# Patient Record
Sex: FEMALE | Race: WHITE | NOT HISPANIC OR LATINO | Employment: STUDENT | URBAN - METROPOLITAN AREA
[De-identification: names, ages, dates, MRNs, and addresses within clinical notes are randomized per-mention and may not be internally consistent; named-entity substitution may affect disease eponyms.]

---
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
## 2021-01-26 ENCOUNTER — TELEPHONE (OUTPATIENT)
Dept: FAMILY MEDICINE CLINIC | Facility: CLINIC | Age: 21
End: 2021-01-26

## 2021-01-26 NOTE — TELEPHONE ENCOUNTER
Patient called and stated that she got the first 2 guardisal shots, and she wanted to know if she is do for the 3rd or if not when will she be due   Please advise

## 2021-01-26 NOTE — TELEPHONE ENCOUNTER
Spoke with pt she would be due for her 3rd shot the 3rd or 4th week of February  Pt is at school will check schedule and call the schedule 3rd shot  Did advise we only have nurse visits M, W and Fridays

## 2021-02-05 ENCOUNTER — TELEMEDICINE (OUTPATIENT)
Dept: FAMILY MEDICINE CLINIC | Facility: CLINIC | Age: 21
End: 2021-02-05
Payer: COMMERCIAL

## 2021-02-05 DIAGNOSIS — U07.1 COVID-19: Primary | ICD-10-CM

## 2021-02-05 PROCEDURE — 1036F TOBACCO NON-USER: CPT | Performed by: NURSE PRACTITIONER

## 2021-02-05 PROCEDURE — 3725F SCREEN DEPRESSION PERFORMED: CPT | Performed by: NURSE PRACTITIONER

## 2021-02-05 PROCEDURE — 99213 OFFICE O/P EST LOW 20 MIN: CPT | Performed by: NURSE PRACTITIONER

## 2021-02-05 NOTE — PROGRESS NOTES
COVID-19 Virtual Visit     Assessment/Plan:    Problem List Items Addressed This Visit        Other    COVID-19 - Primary       COVID-19 Home Care Guidelines    Your healthcare provider and/or public health staff have evaluated you and have determined that you do not need to remain in the hospital at this time  At this time you can be isolated at home where you will be monitored by staff from your local or state health department  You should carefully follow the prevention and isolation steps below until a healthcare provider or local or state health department says that you can return to your normal activities  Stay home except to get medical care    People who are mildly ill with COVID-19 are able to isolate at home during their illness  You should restrict activities outside your home, except for getting medical care  Do not go to work, school, or public areas  Avoid using public transportation, ride-sharing, or taxis  Separate yourself from other people and animals in your home    People: As much as possible, you should stay in a specific room and away from other people in your home  Also, you should use a separate bathroom, if available  Animals: You should restrict contact with pets and other animals while you are sick with COVID-19, just like you would around other people  Although there have not been reports of pets or other animals becoming sick with COVID-19, it is still recommended that people sick with COVID-19 limit contact with animals until more information is known about the virus  When possible, have another member of your household care for your animals while you are sick  If you are sick with COVID-19, avoid contact with your pet, including petting, snuggling, being kissed or licked, and sharing food  If you must care for your pet or be around animals while you are sick, wash your hands before and after you interact with pets and wear a facemask   See COVID-19 and Animals for more information  Call ahead before visiting your doctor    If you have a medical appointment, call the healthcare provider and tell them that you have or may have COVID-19  This will help the healthcare providers office take steps to keep other people from getting infected or exposed  Wear a facemask    You should wear a facemask when you are around other people (e g , sharing a room or vehicle) or pets and before you enter a healthcare providers office  If you are not able to wear a facemask (for example, because it causes trouble breathing), then people who live with you should not stay in the same room with you, or they should wear a facemask if they enter your room  Cover your coughs and sneezes    Cover your mouth and nose with a tissue when you cough or sneeze  Throw used tissues in a lined trash can  Immediately wash your hands with soap and water for at least 20 seconds or, if soap and water are not available, clean your hands with an alcohol-based hand  that contains at least 60% alcohol  Clean your hands often    Wash your hands often with soap and water for at least 20 seconds, especially after blowing your nose, coughing, or sneezing; going to the bathroom; and before eating or preparing food  If soap and water are not readily available, use an alcohol-based hand  with at least 60% alcohol, covering all surfaces of your hands and rubbing them together until they feel dry  Soap and water are the best option if hands are visibly dirty  Avoid touching your eyes, nose, and mouth with unwashed hands  Avoid sharing personal household items    You should not share dishes, drinking glasses, cups, eating utensils, towels, or bedding with other people or pets in your home  After using these items, they should be washed thoroughly with soap and water      Clean all high-touch surfaces everyday    High touch surfaces include counters, tabletops, doorknobs, bathroom fixtures, toilets, phones, keyboards, tablets, and bedside tables  Also, clean any surfaces that may have blood, stool, or body fluids on them  Use a household cleaning spray or wipe, according to the label instructions  Labels contain instructions for safe and effective use of the cleaning product including precautions you should take when applying the product, such as wearing gloves and making sure you have good ventilation during use of the product  Monitor your symptoms    Seek prompt medical attention if your illness is worsening (e g , difficulty breathing)  Before seeking care, call your healthcare provider and tell them that you have, or are being evaluated for, COVID-19  Put on a facemask before you enter the facility  These steps will help the healthcare providers office to keep other people in the office or waiting room from getting infected or exposed  Ask your healthcare provider to call the local or [facility] health department  Persons who are placed under active monitoring or facilitated self-monitoring should follow instructions provided by their local health department or occupational health professionals, as appropriate  If you have a medical emergency and need to call 911, notify the dispatch personnel that you have, or are being evaluated for COVID-19  If possible, put on a facemask before emergency medical services arrive      Discontinuing home isolation    Patients with confirmed COVID-19 should remain under home isolation precautions until the following conditions are met:   - They have had no fever for at least 24 hours (that is one full day of no fever without the use medicine that reduces fevers)  AND  - other symptoms have improved (for example, when their cough or shortness of breath have improved)  AND  - If had mild or moderate illness, at least 10 days have passed since their symptoms first appeared or if severe illness (needed oxygen) or immunosuppressed, at least 20 days have passed since symptoms first appeared  Patients with confirmed COVID-19 should also notify close contacts (including their workplace) and ask that they self-quarantine  Currently, close contact is defined as being within 6 feet for 15 minutes or more from the period 24 hours starting 48 hours before symptom onset to the time at which the patient went into isolation  Close contacts of patients diagnosed with COVID-19 should be instructed by the patient to self-quarantine for 14 days from the last time of their last contact with the patient  Source: [employer]   Disposition:     I recommended self-quarantine for 10 days and to watch for symptoms until 14 days after exposure  If patient were to develop symptoms, they should self isolate and call our office for further guidance  I have spent 15 minutes directly with the patient  Greater than 50% of this time was spent in counseling/coordination of care regarding: diagnostic results, prognosis, risks and benefits of treatment options, instructions for management, patient and family education, importance of treatment compliance, risk factor reductions and impressions  Encounter provider SAMUEL Soni    Provider located at [address] [place] [phone]    Recent Visits  Date Type Provider Dept   02/05/21 [id number] SAMUEL [facility]   Showing recent visits within past 7 days and meeting all other requirements     Future Appointments  No visits were found meeting these conditions  Showing future appointments within next 150 days and meeting all other requirements      This virtual check-in was done via [employer] and patient was informed that this is not a secure, HIPAA-compliant platform  She agrees to proceed      Patient agrees to participate in a virtual check in via telephone or video visit instead of presenting to the office to address urgent/immediate medical needs  Patient is aware this is a billable service  After connecting through [facility], the patient was identified by name and date of birth  Boom Qiu was informed that this was a telemedicine visit and that the exam was being conducted confidentially over secure lines  My office door was closed  No one else was in the room  Boom Qiu acknowledged consent and understanding of privacy and security of the telemedicine visit  I informed the patient that I have reviewed her record in Epic and presented the opportunity for her to ask any questions regarding the visit today  The patient agreed to participate  Subjective:   Boom Qiu is a 24 y o  female who is concerned about COVID-19  Patient's symptoms include chills, fatigue, nasal congestion, rhinorrhea, anosmia, loss of taste, shortness of breath and myalgias  Patient denies fever, malaise, sore throat, cough, chest tightness, abdominal pain, nausea, vomiting, diarrhea and headaches       Exposure:   Contact with a person who is under investigation (PUI) for or who is positive for COVID-19 within the last 14 days?: Yes    Hospitalized recently for fever and/or lower respiratory symptoms?: No      Currently a healthcare worker that is involved in direct patient care?: No      Works in a special setting where the risk of COVID-19 transmission may be high? (this may include long-term care, correctional and assisted facilities; homeless shelters; assisted-living facilities and group homes ): No      Resident in a special setting where the risk of COVID-19 transmission may be high? (this may include long-term care, correctional and assisted facilities; homeless shelters; assisted-living facilities and group homes ): No      Went to school on 1/17 and came home on 1/21  Went back to school on 1/26 and boyfriend tested positive on 1/27  Her tests were negative at first  Then tested positive on 2/3  Symptoms started on 1/29  Body aches, ha, fatigue, loss of taste and smell, fever  Now feels better  Mild achy and no taste and smell  Staying in isolation        Lab Results   Component Value Date    SARSCORONAVI Detected (A) 02/03/2021     Past Medical History:   Diagnosis Date    Febrile seizure ([facility] )     10/21/2003, 1/2005, 6/2005    Formula intolerance     GE reflux     History of being hospitalized 10/21/2003    FEB SZ, RLL PNEUMONIA, HERPANGINA, EEG WNL    Pneumomediastinum ([facility] ) 03/01/2007    SPONTANEOUS, NO UNDERLYING PROBLEM    Pneumonia      Past Surgical History:   Procedure Laterality Date    NO PAST SURGERIES      WISDOM TOOTH EXTRACTION       Current Outpatient Medications   Medication Sig Dispense Refill    Estarylla 0 25-35 MG-MCG per tablet Take 1 tablet by mouth daily       No current facility-administered medications for this visit  No Known Allergies    Review of Systems   Constitutional: Positive for chills and fatigue  Negative for fever  HENT: Positive for congestion and rhinorrhea  Negative for sore throat  Respiratory: Positive for shortness of breath  Negative for cough and chest tightness  Cardiovascular: Negative for chest pain and palpitations  Gastrointestinal: Negative for abdominal pain, diarrhea, nausea and vomiting  Genitourinary: Negative for dysuria and frequency  Musculoskeletal: Positive for myalgias  Skin: Negative for rash  Neurological: Negative for dizziness, light-headedness and headaches  Hematological: Negative for adenopathy  Psychiatric/Behavioral: Negative for decreased concentration  The patient is not nervous/anxious  Objective: There were no vitals filed for this visit  Physical Exam  Constitutional:       Appearance: Normal appearance  HENT:      Head: Normocephalic and atraumatic  Eyes:      Conjunctiva/sclera: Conjunctivae normal    Neck:      Musculoskeletal: Normal range of motion     Pulmonary:      Effort: Pulmonary effort is normal    Musculoskeletal: Normal range of motion  Neurological:      Mental Status: She is alert and oriented to person, place, and time  Psychiatric:         Mood and Affect: Mood normal          Behavior: Behavior normal      BMI Counseling: There is no height or weight on file to calculate BMI  The BMI is above normal  Nutrition recommendations include reducing portion sizes, decreasing overall calorie intake, 3-5 servings of fruits/vegetables daily, reducing fast food intake, consuming healthier snacks, decreasing soda and/or juice intake, moderation in carbohydrate intake, increasing intake of lean protein, reducing intake of saturated fat and trans fat and reducing intake of cholesterol  Exercise recommendations include moderate aerobic physical activity for 150 minutes/week, exercising 3-5 times per week and strength training exercises  VIRTUAL VISIT DISCLAIMER    Gisele Larson acknowledges that she has consented to an online visit or consultation  She understands that the online visit is based solely on information provided by her, and that, in the absence of a face-to-face physical evaluation by the physician, the diagnosis she receives is both limited and provisional in terms of accuracy and completeness  This is not intended to replace a full medical face-to-face evaluation by the physician  Gisele Larson understands and accepts these terms

## 2021-02-07 PROBLEM — U07.1 COVID-19: Status: ACTIVE | Noted: 2021-02-07

## 2021-02-07 RX ORDER — NORGESTIMATE AND ETHINYL ESTRADIOL 0.25-0.035
1 KIT ORAL DAILY
COMMUNITY
Start: 2021-01-26

## 2021-02-16 ENCOUNTER — TELEPHONE (OUTPATIENT)
Dept: FAMILY MEDICINE CLINIC | Facility: CLINIC | Age: 21
End: 2021-02-16

## 2021-02-16 NOTE — TELEPHONE ENCOUNTER
Patient called and wanted to know if you can write a note for her stating she is cleared to return to sports activity   Please advise

## 2021-04-01 ENCOUNTER — CLINICAL SUPPORT (OUTPATIENT)
Dept: FAMILY MEDICINE CLINIC | Facility: CLINIC | Age: 21
End: 2021-04-01
Payer: COMMERCIAL

## 2021-04-01 DIAGNOSIS — Z23 NEED FOR HPV VACCINATION: Primary | ICD-10-CM

## 2021-04-01 PROCEDURE — 90471 IMMUNIZATION ADMIN: CPT

## 2021-04-01 PROCEDURE — RECHECK

## 2021-04-01 PROCEDURE — 90651 9VHPV VACCINE 2/3 DOSE IM: CPT

## 2021-04-01 NOTE — PROGRESS NOTES
Assessment/Plan:    No problem-specific Assessment & Plan notes found for this encounter  Diagnoses and all orders for this visit:    Need for HPV vaccination  -     HPV VACCINE 9 VALENT IM          Subjective:      Patient ID: Nick Mccloud is a 24 y o  female  HPI        Review of Systems      Objective: There were no vitals taken for this visit           Physical Exam

## 2022-03-03 ENCOUNTER — OFFICE VISIT (OUTPATIENT)
Dept: FAMILY MEDICINE CLINIC | Facility: CLINIC | Age: 22
End: 2022-03-03
Payer: COMMERCIAL

## 2022-03-03 VITALS
DIASTOLIC BLOOD PRESSURE: 80 MMHG | HEIGHT: 70 IN | HEART RATE: 72 BPM | SYSTOLIC BLOOD PRESSURE: 120 MMHG | WEIGHT: 154.8 LBS | TEMPERATURE: 97.8 F | RESPIRATION RATE: 14 BRPM | OXYGEN SATURATION: 99 % | BODY MASS INDEX: 22.16 KG/M2

## 2022-03-03 DIAGNOSIS — S93.401A SPRAIN OF RIGHT ANKLE, UNSPECIFIED LIGAMENT, INITIAL ENCOUNTER: Primary | ICD-10-CM

## 2022-03-03 PROCEDURE — 3008F BODY MASS INDEX DOCD: CPT | Performed by: FAMILY MEDICINE

## 2022-03-03 PROCEDURE — 99213 OFFICE O/P EST LOW 20 MIN: CPT | Performed by: FAMILY MEDICINE

## 2022-03-03 PROCEDURE — 1036F TOBACCO NON-USER: CPT | Performed by: FAMILY MEDICINE

## 2022-03-03 PROCEDURE — 3725F SCREEN DEPRESSION PERFORMED: CPT | Performed by: FAMILY MEDICINE

## 2022-03-03 NOTE — PROGRESS NOTES
Assessment/Plan:       Problem List Items Addressed This Visit        Musculoskeletal and Integument    Sprain of right ankle - Primary     Suspect ankle sprain of the right calcaneofibular ligament   Xray not warranted   Continue with existing conservative treatment program ( seeing the  at school)  May discontinue the use of crutches   Athletic/sports restriction discussed  RICE   Follow up as needed                    Subjective:      Patient ID: Jason Arciniega is a 22 y o  female here with righ tankle pain  Sprained her ankle while playing basketball 1 week ago  Mechanism of injury: inversion of the foot  Was able to to take a few steps shortly after  Not currently playing ( season ended)  and was using crutches the 1st week  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 [employer] and 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 use an assistive device while in lab  She is able to walk without them but school asked that she gets clearance from her doctor  Pt has been applying ice and compression wraps  The following portions of the patient's history were reviewed and updated as appropriate:   She  has a past medical history of Febrile seizure ([facility] [address] ), Formula intolerance, GE reflux, History of being hospitalized (10/21/2003), Pneumomediastinum ([address] ) (03/01/2007), and Pneumonia  She   Patient Active Problem List    Diagnosis Date Noted    Sprain of right ankle 03/04/2022    COVID-19 02/07/2021    Need for influenza vaccination 08/24/2020    Acne vulgaris 07/19/2017     She  has a past surgical history that includes [place] tooth extraction  Her family history includes Arthritis in her maternal grandmother; Cancer in her maternal grandfather, maternal grandmother, and paternal grandmother; Hyperlipidemia in her paternal grandfather; No Known Problems in her brother, father, mother, and sister; Other in her maternal grandfather and paternal grandfather  She  reports that she has never smoked   She has never used smokeless tobacco  She reports previous alcohol use  She reports previous drug use  Current Outpatient Medications on File Prior to Visit   Medication Sig    Estarylla 0 25-35 MG-MCG per tablet Take 1 tablet by mouth daily     No current facility-administered medications on file prior to visit  She has No Known Allergies       Review of Systems   Musculoskeletal: Positive for arthralgias (right ankle pain ) and gait problem (due to pain but is able to bear weight with minimal pain )  Objective:      /80 (BP Location: Left arm, Patient Position: Sitting, Cuff Size: Standard)   Pulse 72   Temp 97 8 °F (36 6 °C) (Tympanic)   Resp 14   Ht 5' 10" (1 778 m)   Wt 70 2 kg (154 lb 12 8 oz)   SpO2 99%   BMI 22 21 kg/m²          Physical Exam  Vitals reviewed  Constitutional:       Appearance: Normal appearance  She is not ill-appearing  HENT:      Head: Normocephalic and atraumatic  Musculoskeletal:      Right ankle: Swelling (mild) present  No deformity  Tenderness present over the CF ligament  No lateral malleolus, medial malleolus, posterior TF ligament or base of 5th metatarsal tenderness  Decreased range of motion (when the ankle is inverted )  Right Achilles Tendon: No tenderness  Skin:     General: Skin is warm  Findings: No bruising or erythema  Neurological:      Mental Status: She is alert  Psychiatric:         Mood and Affect: Mood normal          Behavior: Behavior normal          Thought Content:  Thought content normal          Judgment: Judgment normal

## 2022-03-03 NOTE — LETTER
March 3, 2022     Patient: Jason Arciniega   YOB: 2000   Date of Visit: 3/3/2022       To Whom it May Concern:    Jason Arciniega is under my professional care and was was seen in my office on 3/3/2022 with a right ankle injury  Based on my physical exam, I believed Cassondra Severin sprained her ankle and may return to school without restrictions  If you have any questions or concerns, please don't hesitate to call            Sincerely,          Maura Garcia MD        CC: No Recipients

## 2022-03-04 PROBLEM — S93.401A SPRAIN OF RIGHT ANKLE: Status: ACTIVE | Noted: 2022-03-04

## 2022-03-04 NOTE — ASSESSMENT & PLAN NOTE
Suspect ankle sprain of the right calcaneofibular ligament   Xray not warranted   Continue with existing conservative treatment program ( seeing the  at school)  May discontinue the use of crutches   Athletic/sports restriction discussed  RICE   Follow up as needed

## 2022-05-09 ENCOUNTER — TELEPHONE (OUTPATIENT)
Dept: FAMILY MEDICINE CLINIC | Facility: CLINIC | Age: 22
End: 2022-05-09

## 2022-05-09 ENCOUNTER — OFFICE VISIT (OUTPATIENT)
Dept: FAMILY MEDICINE CLINIC | Facility: CLINIC | Age: 22
End: 2022-05-09
Payer: COMMERCIAL

## 2022-05-09 VITALS
RESPIRATION RATE: 16 BRPM | HEIGHT: 69 IN | DIASTOLIC BLOOD PRESSURE: 70 MMHG | SYSTOLIC BLOOD PRESSURE: 116 MMHG | WEIGHT: 152.4 LBS | BODY MASS INDEX: 22.57 KG/M2 | TEMPERATURE: 97.4 F | OXYGEN SATURATION: 99 % | HEART RATE: 80 BPM

## 2022-05-09 DIAGNOSIS — Z23 ENCOUNTER FOR IMMUNIZATION: ICD-10-CM

## 2022-05-09 DIAGNOSIS — Z00.00 ANNUAL PHYSICAL EXAM: Primary | ICD-10-CM

## 2022-05-09 DIAGNOSIS — Z11.59 NEED FOR HEPATITIS C SCREENING TEST: ICD-10-CM

## 2022-05-09 PROCEDURE — 99395 PREV VISIT EST AGE 18-39: CPT | Performed by: FAMILY MEDICINE

## 2022-05-09 PROCEDURE — 3008F BODY MASS INDEX DOCD: CPT | Performed by: FAMILY MEDICINE

## 2022-05-09 PROCEDURE — 1036F TOBACCO NON-USER: CPT | Performed by: FAMILY MEDICINE

## 2022-05-09 NOTE — PROGRESS NOTES
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  Soles requesting titers to check for MMR, tetanus, varicella, and hepatitis-B  Patient states she has not had tetanus vaccine since   TB screen also requested  Performed PPD testing today  Will return in 2 days for interpretation  Titers ordered  STI screening declined  Hepatitis-C screening consented  Will complete forms once we receive her lab results  Other Visit Diagnoses     Need for hepatitis C screening test        Relevant Orders    Hepatitis C Antibody ([facility])    Encounter for immunization        Relevant Orders    Measles/Mumps/Rubella Immunity    Varicella zoster antibody, IgG    Diptheria + Tetanus Antitoxoid    Hepatitis B surface antibody          Immunizations and preventive care screenings were discussed with patient today  Appropriate education was printed on patient's after visit summary  Counseling:  Dental Health: discussed importance of regular tooth brushing, flossing, and dental visits  · Exercise: the importance of regular exercise/physical activity was discussed  Recommend exercise 3-5 times per week for at least 30 minutes  No follow-ups on file  Chief Complaint:     Chief Complaint   Patient presents with    Annual Exam     PT is here for annual exam for college  History of Present Illness:     Adult Annual Physical   Patient here for a comprehensive physical exam  The patient reports no problems  Starting clinicals in August  Needs titers and Tdap  Home for the summer  Will still play basketball  Diet and Physical Activity  · Diet/Nutrition: well balanced diet  · Exercise: active, plays basketball   Depression Screening  PHQ-2/9 Depression Screening         General Health  · Sleep: sleeps well  · Hearing: normal - bilateral   · Vision: no vision problems  · Dental: was seen last June 2021  Hsa an upcoming appt on the 11th   /GYN Health  · Last menstrual period: April 18,2022  · Contraceptive method: oral contraceptives    · History of STDs?: no      Review of Systems:     Review of Systems   Past Medical History:     Past Medical History:   Diagnosis Date    Febrile seizure ([facility] )     10/21/2003, 1/2005, 6/2005    Formula intolerance     GE reflux     History of being hospitalized 10/21/2003    FEB SZ, RLL PNEUMONIA, HERPANGINA, EEG WNL    Pneumomediastinum ([facility] ) 03/01/2007    SPONTANEOUS, NO UNDERLYING PROBLEM    Pneumonia       Past Surgical History:     Past Surgical History:   Procedure Laterality Date    WISDOM TOOTH EXTRACTION        Social History:     Social History     Socioeconomic History    Marital status: Single     Spouse name: None    Number of children: None    Years of education: None    Highest education level: None   Occupational History    None   Tobacco Use    Smoking status: Never Smoker    Smokeless tobacco: Never Used    Tobacco comment: No passive smoke exposure    Vaping Use    Vaping Use: Never used   Substance and Sexual Activity    Alcohol use: Not Currently    Drug use: Not Currently    Sexual activity: Not Currently   Other Topics Concern    None   Social History Narrative    ** Merged History Encounter **         ACTIVITIES: BASKETBALL  ACTIVITIES: SOFTBALL  BRUSHES TEETH DAILY  DENTAL CARE, REGULARLY  FLOSSES TEETH REGULARLY  LIVES WITH PARENTS  SCHOOL PERFORMANCE, EXCELLING  SEEING A DENTIST  SLEEPS 8-10 HOURS A DAY     Social Determinants of Health     Financial Resource Strain: Not on file   Food Insecurity: Not on file   Transportation Needs: Not on file   Physical Activity: Not on file   Stress: Not on file   Social Connections: Not on file   Intimate Partner Violence: Not on file   Housing Stability: Not on file      Family History:     Family History   Problem Relation Age of Onset    No Known Problems Mother     No Known Problems Father     No Known Problems Sister     No Known Problems Brother     Arthritis Maternal Grandmother     Cancer Maternal Grandmother     Cancer Maternal Grandfather     Other Maternal Grandfather         HIGH BLOOD PRESSURE    Cancer Paternal Grandmother     Other Paternal Grandfather         HIGH BLOOD PRESSURE    Hyperlipidemia Paternal Grandfather       Current Medications:     Current Outpatient Medications   Medication Sig Dispense Refill    Estarylla 0 25-35 MG-MCG per tablet Take 1 tablet by mouth daily       No current facility-administered medications for this visit  Allergies:     No Known Allergies   Physical Exam:     /70   Pulse 80   Temp (!) 97 4 °F (36 3 °C) (Tympanic)   Resp 16   Ht 5' 9 49" (1 765 m)   Wt 69 1 kg (152 lb 6 4 oz)   SpO2 99%   BMI 22 19 kg/m²     Physical Exam  Vitals reviewed  Constitutional:       General: She is not in acute distress  Appearance: Normal appearance  She is not ill-appearing  HENT:      Head: Normocephalic and atraumatic  Right Ear: Tympanic membrane normal       Left Ear: Tympanic membrane normal       Mouth/Throat:      Pharynx: No posterior oropharyngeal erythema  Eyes:      Extraocular Movements: Extraocular movements intact  Cardiovascular:      Rate and Rhythm: Normal rate and regular rhythm  Heart sounds: No murmur heard  Pulmonary:      Effort: Pulmonary effort is normal       Breath sounds: Normal breath sounds  Abdominal:      General: Abdomen is flat  There is no distension  Palpations: There is no mass  Tenderness: There is no abdominal tenderness  There is no guarding or rebound  Hernia: No hernia is present  Musculoskeletal:      Right lower leg: No edema  Left lower leg: No edema  Lymphadenopathy:      Cervical: No cervical adenopathy  Skin:     General: Skin is warm  Neurological:      General: No focal deficit present  Mental Status: She is alert and oriented to person, place, and time  Psychiatric:         Mood and Affect: Mood normal          Behavior: Behavior normal          Thought Content:  Thought content normal           Devon Elias MD   [address] [employer]

## 2022-05-11 ENCOUNTER — CLINICAL SUPPORT (OUTPATIENT)
Dept: FAMILY MEDICINE CLINIC | Facility: CLINIC | Age: 22
End: 2022-05-11
Payer: COMMERCIAL

## 2022-05-11 DIAGNOSIS — Z23 ENCOUNTER FOR IMMUNIZATION: Primary | ICD-10-CM

## 2022-05-11 LAB
C DIPHTHERIAE AB SER IA-ACNC: 0.37 IU/ML
C TETANI AB SER-ACNC: 0.3 IU/ML
HBV SURFACE AB SERPL IA-ACNC: <5 MIU/ML
HCV AB S/CO SERPL IA: 0.09
HCV AB SERPL QL IA: NORMAL
MEV IGG SER IA-ACNC: >300 AU/ML
MUV IGG SER IA-ACNC: >300 AU/ML
RUBV IGG SERPL IA-ACNC: 11.7 INDEX
VZV IGG SER IA-ACNC: 257.8 INDEX

## 2022-05-11 PROCEDURE — 90746 HEPB VACCINE 3 DOSE ADULT IM: CPT

## 2022-05-11 PROCEDURE — 90471 IMMUNIZATION ADMIN: CPT

## 2022-05-11 PROCEDURE — 90472 IMMUNIZATION ADMIN EACH ADD: CPT

## 2022-05-11 PROCEDURE — 90715 TDAP VACCINE 7 YRS/> IM: CPT

## 2022-05-11 NOTE — PROGRESS NOTES
Assessment/Plan:    No problem-specific Assessment & Plan notes found for this encounter  Diagnoses and all orders for this visit:    Encounter for immunization  -     HEPATITIS B VACCINE ADULT IM  -     TDAP VACCINE GREATER THAN OR EQUAL TO 8YO IM          Subjective:      Patient ID: Kia Zavaleta is a 22 y o  female  HPI        Review of Systems      Objective: There were no vitals taken for this visit           Physical Exam

## 2022-05-23 ENCOUNTER — TELEPHONE (OUTPATIENT)
Dept: FAMILY MEDICINE CLINIC | Facility: CLINIC | Age: 22
End: 2022-05-23

## 2022-05-26 NOTE — TELEPHONE ENCOUNTER
Forms in draw up front Mother and maternal aunt at pt's bedside. Maternal aunt left room right after they arrived to visit. RN remained in the room with mother until social work came by to speak with mom. Mom and social work went to respite room. Maternal aunt returned to pt's room and laid on the couch. Mom returned to pt's room and RN updated mom on pt's condition. Reiterated to mom importance of appropriate developmental interactions (opening portholes not during assessments and touching baby causing agitation). Mom stepped out of room, maternal aunt sleeping on couch through RN assessment. Mom returned, spoke with MD for an update; shortly after, mom and aunt left the hospital. Mom states she will call back later for update.

## 2022-07-13 ENCOUNTER — CLINICAL SUPPORT (OUTPATIENT)
Dept: FAMILY MEDICINE CLINIC | Facility: CLINIC | Age: 22
End: 2022-07-13
Payer: COMMERCIAL

## 2022-07-13 DIAGNOSIS — Z23 ENCOUNTER FOR IMMUNIZATION: Primary | ICD-10-CM

## 2022-07-13 PROCEDURE — 90471 IMMUNIZATION ADMIN: CPT

## 2022-07-13 PROCEDURE — 90746 HEPB VACCINE 3 DOSE ADULT IM: CPT

## 2022-08-19 ENCOUNTER — TELEPHONE (OUTPATIENT)
Dept: FAMILY MEDICINE CLINIC | Facility: CLINIC | Age: 22
End: 2022-08-19

## 2022-08-22 NOTE — TELEPHONE ENCOUNTER
She will need 1 more vaccine 6 months after the first one  Then we'll retest 1-2 month after completing the series to confirm immunity  Thank you   Dr Modesto Lopez

## 2022-11-17 ENCOUNTER — CLINICAL SUPPORT (OUTPATIENT)
Dept: FAMILY MEDICINE CLINIC | Facility: CLINIC | Age: 22
End: 2022-11-17

## 2022-11-17 DIAGNOSIS — Z23 ENCOUNTER FOR IMMUNIZATION: Primary | ICD-10-CM

## 2022-11-17 NOTE — PROGRESS NOTES
Name: Ny Mahmood      : 2000      MRN: [id number]  Encounter Provider: Lauren Garcia LPN  Encounter Date: 2022   Encounter department: [place] [address]  Encounter for immunization  -     HEPATITIS B VACCINE ADULT IM           Subjective      HPI  Review of Systems    Current Outpatient Medications on File Prior to Visit   Medication Sig   • Estarylla 0 25-35 MG-MCG per tablet Take 1 tablet by mouth daily       Objective     There were no vitals taken for this visit      Lauren Garcia LPN

## 2023-01-06 ENCOUNTER — TELEPHONE (OUTPATIENT)
Dept: FAMILY MEDICINE CLINIC | Facility: CLINIC | Age: 23
End: 2023-01-06

## 2023-01-06 DIAGNOSIS — Z78.9 NONIMMUNE TO HEPATITIS B VIRUS: Primary | ICD-10-CM

## 2023-01-06 NOTE — TELEPHONE ENCOUNTER
Patient called and stated that she had to have her Hep B series done again got her last one done on 11/17/22 and she said she just needs to have the titter done again to make sure she is immune

## 2023-01-09 LAB — HBV SURFACE AB SER QL IA: REACTIVE

## 2023-02-24 ENCOUNTER — TELEPHONE (OUTPATIENT)
Dept: OTHER | Facility: OTHER | Age: 23
End: 2023-02-24

## 2023-02-24 NOTE — TELEPHONE ENCOUNTER
Patient's mother called in for advise about patient having a cold sore on  tip of nose and treated with valcyclovir in past  Nose gets red every night and advised to follow up with PCP  Patient attends college in [place] and playing basketball  Mother will have patient call in to schedule OV with PCP based on patient's school and sports activities

## 2023-03-29 ENCOUNTER — TELEPHONE (OUTPATIENT)
Dept: ADMINISTRATIVE | Facility: OTHER | Age: 23
End: 2023-03-29

## 2023-03-29 NOTE — TELEPHONE ENCOUNTER
----- Message from [facility] sent at 3/28/2023  1:48 PM EDT -----  Regarding: care gap request  03/28/23 1:48 PM    Hello, our patient attached above has had Pap Smear (HPV) aka Cervical Cancer Screening completed/performed  Please assist in updating the patient chart by pulling the Care Everywhere (CE) document  The date of service is 03/22/2023       Thank you,  Leidy Enriquez  [facility] [place]

## 2023-03-31 NOTE — TELEPHONE ENCOUNTER
Upon review of the In Basket request we were able to locate, review, and update the patient chart as requested for Diabetic Eye Exam     Any additional questions or concerns should be emailed to the Practice Liaisons via the appropriate education email address, please do not reply via In Basket      Thank you  Crissy Panchal

## 2023-04-06 ENCOUNTER — OFFICE VISIT (OUTPATIENT)
Dept: FAMILY MEDICINE CLINIC | Facility: CLINIC | Age: 23
End: 2023-04-06

## 2023-04-06 VITALS
BODY MASS INDEX: 22.81 KG/M2 | DIASTOLIC BLOOD PRESSURE: 78 MMHG | WEIGHT: 154 LBS | HEART RATE: 89 BPM | OXYGEN SATURATION: 98 % | SYSTOLIC BLOOD PRESSURE: 120 MMHG | RESPIRATION RATE: 16 BRPM | HEIGHT: 69 IN | TEMPERATURE: 97.7 F

## 2023-04-06 DIAGNOSIS — B00.1 RECURRENT COLD SORES: Primary | ICD-10-CM

## 2023-04-06 DIAGNOSIS — L53.9 ERYTHEMA OF SKIN OF NOSE: ICD-10-CM

## 2023-04-06 RX ORDER — VALACYCLOVIR HYDROCHLORIDE 500 MG/1
500 TABLET, FILM COATED ORAL DAILY
Qty: 30 TABLET | Refills: 2 | Status: SHIPPED | OUTPATIENT
Start: 2023-04-06 | End: 2023-05-06

## 2023-04-06 NOTE — PROGRESS NOTES
"Name: Salinas Kerr      : 2000      MRN: [id number]  Encounter Provider: Aiden Cardona MD  Encounter Date: 2023   Encounter department: [place] [address]  Recurrent cold sores  Comments:  History of recurrent cold sores since the third grade  She typically has 3 outbreaks a year and lesions will take several weeks to heal   Interested in prophylaxis  We will start valacyclovir 500 mg daily  Orders:  -     valACYclovir (VALTREX) 500 mg tablet; Take 1 tablet (500 mg total) by mouth daily    2  Erythema of skin of nose  Comments:  Erythema noted at the tip of the nose  Intermittent and self-limited  Etiology unclear  Will refer to dermatology  Orders:  -     Ambulatory Referral to Dermatology; Future  Patient will return to clinic in 2 months for annual and TB screening          Subjective      Seen today to evaluate a erythematous lesion on the tip of her nose  Patient states in the last 3 months she has had episodes where the tip of the nose is red, hot, and itchy  Patient has self-limited and typically resolves overnight  She is also here to address her recurrent cold sores  She is dealt with this since the 3 rd grade  Currently uses valacyclovir during her outbreaks  She has had 3 outbreaks over the past year  She recently found out about Campho phenica which hastens the healing process    Review of Systems   Skin: Positive for color change and rash  Current Outpatient Medications on File Prior to Visit   Medication Sig   • Estarylla 0 25-35 MG-MCG per tablet Take 1 tablet by mouth daily       Objective     /78 (BP Location: Left arm, Patient Position: Sitting, Cuff Size: Standard)   Pulse 89   Temp 97 7 °F (36 5 °C) (Tympanic)   Resp 16   Ht 5' 9 25\" (1 759 m)   Wt 69 9 kg (154 lb)   SpO2 98%   BMI 22 58 kg/m²     Physical Exam  Constitutional:       General: She is not in acute distress  Appearance: Normal appearance   She " is not ill-appearing or toxic-appearing  HENT:      Head: Normocephalic and atraumatic  Comments: erythematous dry crusty patch under the right nostril   Tip of the noses mildly erythematous  Pulmonary:      Effort: Pulmonary effort is normal    Neurological:      Mental Status: She is alert         Morales Alvarado MD

## 2023-05-15 ENCOUNTER — OFFICE VISIT (OUTPATIENT)
Dept: FAMILY MEDICINE CLINIC | Facility: CLINIC | Age: 23
End: 2023-05-15

## 2023-05-15 VITALS
HEART RATE: 65 BPM | TEMPERATURE: 97.1 F | OXYGEN SATURATION: 99 % | RESPIRATION RATE: 16 BRPM | BODY MASS INDEX: 22.69 KG/M2 | DIASTOLIC BLOOD PRESSURE: 72 MMHG | HEIGHT: 69 IN | WEIGHT: 153.2 LBS | SYSTOLIC BLOOD PRESSURE: 112 MMHG

## 2023-05-15 DIAGNOSIS — Z00.00 ENCOUNTER FOR ANNUAL PHYSICAL EXAM: Primary | ICD-10-CM

## 2023-05-15 DIAGNOSIS — Z11.1 ENCOUNTER FOR PPD TEST: ICD-10-CM

## 2023-05-15 RX ORDER — AMOXICILLIN 500 MG/1
1000 CAPSULE ORAL 2 TIMES DAILY
COMMUNITY
Start: 2023-05-07

## 2023-05-15 NOTE — PROGRESS NOTES
[facility] PRACTICE    NAME: Lobito Escobedo  AGE: 25 y o  SEX: female  : 2000     DATE: 5/15/2023     Assessment and Plan:     Problem List Items Addressed This Visit    None  Visit Diagnoses     Encounter for annual physical exam    -  Primary    Healthy female  Doing well  NAD  Recovering from a recent ear infection and is on day   UTD for pap smear  TB screening performed and will return in 48 hrs to read  RTC in 1 year for annual or sooner as needed    Encounter for PPD test        Relevant Orders    TB Skin Test (Completed)          Immunizations and preventive care screenings were discussed with patient today  Appropriate education was printed on patient's after visit summary  Counseling:  Sexual health: discussed sexually transmitted diseases, partner selection, use of condoms, avoidance of unintended pregnancy, and contraceptive alternatives  Exercise: the importance of regular exercise/physical activity was discussed  Recommend exercise 3-5 times per week for at least 30 minutes  No follow-ups on file  Chief Complaint:     Chief Complaint   Patient presents with   • Physical Exam     Patient is being seen for an annual physical exam        History of Present Illness:     Adult Annual Physical   Patient here for a comprehensive physical exam  The patient reports no problems  Diagnosed with an ear infection last week and is on Amoxicillin ( day )  Feeling better  Diet and Physical Activity  Diet/Nutrition: well balanced diet  Exercise: plays several sports  Depression Screening  PHQ-2/9 Depression Screening         General Health  Sleep: 6-8 hours since completing the semester   Hearing: normal - bilateral   Vision: no vision problems  Dental: 1 year ago but has an appt in   /GYN Health  Last menstrual period: 5 15 23  Contraceptive method: oral contraceptives  History of STDs? Juwan [place] no      Review of Systems:     Review of Systems   Constitutional: Negative for fever  HENT: Positive for ear pain (resolved )         Past Medical History:     Past Medical History:   Diagnosis Date   • Feb[facility] ([facility] )     10/21/2003, 1/2005, 6/2005   • Formula intolerance    • GE reflux    • History of being hospitalized 10/21/2003    FEB SZ, RLL PNEUMONIA, HERPANGINA, EEG WNL   • History of cold sores    • Pneumomediastinum ([facility] [address] ) 03/01/2007    SPONTANEOUS, NO UNDERLYING PROBLEM   • Pneumonia       Past Surgical History:     Past Surgical History:   Procedure Laterality Date   • WISDOM TOOTH EXTRACTION        Social History:     Social History     Socioeconomic History   • Marital status: Single     Spouse name: None   • Number of children: None   • Years of education: None   • Highest education level: None   Occupational History   • None   Tobacco Use   • Smoking status: Never     Passive exposure: Never   • Smokeless tobacco: Never   • Tobacco comments:     No passive smoke exposure    Vaping Use   • Vaping Use: Never used   Substance and Sexual Activity   • Alcohol use: Not Currently   • Drug use: Not Currently   • Sexual activity: Not Currently   Other Topics Concern   • None   Social History Narrative    ** Merged History Encounter **         ACTIVITIES: BASKETBALL  ACTIVITIES: SOFTBALL  BRUSHES TEETH DAILY  DENTAL CARE, REGULARLY  FLOSSES TEETH REGULARLY  LIVES WITH PARENTS  SCHOOL PERFORMANCE, EXCELLING  SEEING A DENTIST  SLEEPS 8-10 HOURS A DAY     Social Determinants of Health     Financial Resource Strain: Not on file   Food Insecurity: Not on file   Transportation Needs: Not on file   Physical Activity: Not on file   Stress: Not on file   Social Connections: Not on file   Intimate Partner Violence: Not on file   Housing Stability: Not on file      Family History:     Family History   Problem Relation Age of Onset   • No Known Problems Mother    • No Known Problems Father    • No Known Problems "Sister    • No Known Problems Brother    • Arthritis Maternal Grandmother    • Cancer Maternal Grandmother    • Cancer Maternal Grandfather    • Other Maternal Grandfather         HIGH BLOOD PRESSURE   • Cancer Paternal Grandmother    • Other Paternal Grandfather         HIGH BLOOD PRESSURE   • Hyperlipidemia Paternal Grandfather       Current Medications:     Current Outpatient Medications   Medication Sig Dispense Refill   • amoxicillin (AMOXIL) 500 mg capsule Take 1,000 mg by mouth 2 (two) times a day     • Estarylla 0 25-35 MG-MCG per tablet Take 1 tablet by mouth daily     • valACYclovir (VALTREX) 500 mg tablet Take 1 tablet (500 mg total) by mouth daily 30 tablet 2     No current facility-administered medications for this visit  Allergies:     No Known Allergies   Physical Exam:     /72 (BP Location: Left arm, Patient Position: Sitting, Cuff Size: Adult)   Pulse 65   Temp (!) 97 1 °F (36 2 °C) (Tympanic)   Resp 16   Ht 5' 9 13\" (1 756 m)   Wt 69 5 kg (153 lb 3 2 oz)   LMP 05/15/2023 (Exact Date)   SpO2 99%   BMI 22 54 kg/m²     Physical Exam  Vitals reviewed  Constitutional:       General: She is not in acute distress  Appearance: Normal appearance  She is not ill-appearing or toxic-appearing  HENT:      Head: Normocephalic and atraumatic  Right Ear: Tympanic membrane normal       Left Ear: Tympanic membrane normal       Mouth/Throat:      Pharynx: No oropharyngeal exudate  Eyes:      Extraocular Movements: Extraocular movements intact  Cardiovascular:      Rate and Rhythm: Normal rate and regular rhythm  Heart sounds: No murmur heard  Pulmonary:      Effort: Pulmonary effort is normal  No respiratory distress  Breath sounds: Normal breath sounds  Abdominal:      General: Abdomen is flat  There is no distension  Palpations: There is no mass  Tenderness: There is no abdominal tenderness  There is no guarding or rebound        Hernia: No hernia is " present  Musculoskeletal:      Right lower leg: No edema  Left lower leg: No edema  Lymphadenopathy:      Cervical: No cervical adenopathy  Skin:     General: Skin is warm  Neurological:      Mental Status: She is alert and oriented to person, place, and time  Psychiatric:         Mood and Affect: Mood normal          Thought Content:  Thought content normal           Sharad Farris MD   [facility]

## 2023-05-17 ENCOUNTER — CLINICAL SUPPORT (OUTPATIENT)
Dept: FAMILY MEDICINE CLINIC | Facility: CLINIC | Age: 23
End: 2023-05-17

## 2023-05-17 DIAGNOSIS — Z11.1 ENCOUNTER FOR PPD SKIN TEST READING: Primary | ICD-10-CM

## 2023-05-17 LAB
INDURATION: 0 MM
TB SKIN TEST: NEGATIVE

## 2023-05-17 NOTE — PROGRESS NOTES
Name: Juan King      : 2000      MRN: [id number]  Encounter Provider: Adams Melgar  Encounter Date: 2023   Encounter department: [place] [address]   Encounter for PPD skin test reading           Subjective          Current Outpatient Medications on File Prior to Visit   Medication Sig   • amoxicillin (AMOXIL) 500 mg capsule Take 1,000 mg by mouth 2 (two) times a day   • Estarylla 0 25-35 MG-MCG per tablet Take 1 tablet by mouth daily   • valACYclovir (VALTREX) 500 mg tablet Take 1 tablet (500 mg total) by mouth daily       Objective     LMP 05/15/2023 (Exact Date)     Adams Melgar

## 2023-07-28 ENCOUNTER — TELEPHONE (OUTPATIENT)
Dept: FAMILY MEDICINE CLINIC | Facility: CLINIC | Age: 23
End: 2023-07-28

## 2023-07-28 DIAGNOSIS — Z13.6 SCREENING FOR CARDIOVASCULAR CONDITION: Primary | ICD-10-CM

## 2023-07-28 NOTE — TELEPHONE ENCOUNTER
Patient is inquiring if you could put labs in for her to get done to check vitamin levels and check blood count. She said she feels ok but was just wondering what her levels are.     Please advise

## 2023-08-07 ENCOUNTER — TELEPHONE (OUTPATIENT)
Dept: FAMILY MEDICINE CLINIC | Facility: CLINIC | Age: 23
End: 2023-08-07

## 2023-08-07 NOTE — TELEPHONE ENCOUNTER
Patient called and stated that she needs her TB test dates of it being read and administer and the results of the test. Is there a way you could do that?

## 2023-08-11 LAB
ALBUMIN SERPL-MCNC: 4.3 G/DL (ref 3.6–5.1)
ALBUMIN/GLOB SERPL: 1.4 (CALC) (ref 1–2.5)
ALP SERPL-CCNC: 47 U/L (ref 31–125)
ALT SERPL-CCNC: 13 U/L (ref 6–29)
AST SERPL-CCNC: 14 U/L (ref 10–30)
BILIRUB SERPL-MCNC: 0.5 MG/DL (ref 0.2–1.2)
BUN SERPL-MCNC: 17 MG/DL (ref 7–25)
BUN/CREAT SERPL: 14 (CALC) (ref 6–22)
CALCIUM SERPL-MCNC: 9.8 MG/DL (ref 8.6–10.2)
CHLORIDE SERPL-SCNC: 103 MMOL/L (ref 98–110)
CHOLEST SERPL-MCNC: 206 MG/DL
CHOLEST/HDLC SERPL: 2.1 (CALC)
CO2 SERPL-SCNC: 26 MMOL/L (ref 20–32)
CREAT SERPL-MCNC: 1.2 MG/DL (ref 0.5–0.96)
GFR/BSA.PRED SERPLBLD CYS-BASED-ARV: 66 ML/MIN/1.73M2
GLOBULIN SER CALC-MCNC: 3.1 G/DL (CALC) (ref 1.9–3.7)
GLUCOSE SERPL-MCNC: 86 MG/DL (ref 65–99)
HDLC SERPL-MCNC: 96 MG/DL
LDLC SERPL CALC-MCNC: 88 MG/DL (CALC)
NONHDLC SERPL-MCNC: 110 MG/DL (CALC)
POTASSIUM SERPL-SCNC: 4 MMOL/L (ref 3.5–5.3)
PROT SERPL-MCNC: 7.4 G/DL (ref 6.1–8.1)
SODIUM SERPL-SCNC: 137 MMOL/L (ref 135–146)
TRIGL SERPL-MCNC: 123 MG/DL

## 2023-08-17 DIAGNOSIS — R79.89 ELEVATED SERUM CREATININE: Primary | ICD-10-CM

## 2023-08-23 LAB
BUN SERPL-MCNC: 15 MG/DL (ref 7–25)
BUN/CREAT SERPL: NORMAL (CALC) (ref 6–22)
CALCIUM SERPL-MCNC: 9.7 MG/DL (ref 8.6–10.2)
CHLORIDE SERPL-SCNC: 102 MMOL/L (ref 98–110)
CO2 SERPL-SCNC: 28 MMOL/L (ref 20–32)
CREAT SERPL-MCNC: 0.96 MG/DL (ref 0.5–0.96)
GFR/BSA.PRED SERPLBLD CYS-BASED-ARV: 86 ML/MIN/1.73M2
GLUCOSE SERPL-MCNC: 90 MG/DL (ref 65–99)
POTASSIUM SERPL-SCNC: 4.2 MMOL/L (ref 3.5–5.3)
SODIUM SERPL-SCNC: 136 MMOL/L (ref 135–146)

## 2023-10-11 DIAGNOSIS — B00.1 RECURRENT COLD SORES: ICD-10-CM

## 2023-10-11 RX ORDER — VALACYCLOVIR HYDROCHLORIDE 500 MG/1
500 TABLET, FILM COATED ORAL DAILY
Qty: 30 TABLET | Refills: 0 | Status: SHIPPED | OUTPATIENT
Start: 2023-10-11 | End: 2023-11-10

## 2023-11-08 DIAGNOSIS — B00.1 RECURRENT COLD SORES: ICD-10-CM

## 2023-11-08 RX ORDER — VALACYCLOVIR HYDROCHLORIDE 500 MG/1
500 TABLET, FILM COATED ORAL DAILY
Qty: 30 TABLET | Refills: 0 | Status: SHIPPED | OUTPATIENT
Start: 2023-11-08 | End: 2023-12-08

## 2023-12-13 DIAGNOSIS — B00.1 RECURRENT COLD SORES: ICD-10-CM

## 2023-12-13 RX ORDER — VALACYCLOVIR HYDROCHLORIDE 500 MG/1
500 TABLET, FILM COATED ORAL DAILY
Qty: 30 TABLET | Refills: 0 | Status: SHIPPED | OUTPATIENT
Start: 2023-12-13 | End: 2024-01-12

## 2024-01-31 DIAGNOSIS — B00.1 RECURRENT COLD SORES: ICD-10-CM

## 2024-01-31 RX ORDER — VALACYCLOVIR HYDROCHLORIDE 500 MG/1
500 TABLET, FILM COATED ORAL DAILY
Qty: 90 TABLET | Refills: 1 | Status: SHIPPED | OUTPATIENT
Start: 2024-01-31 | End: 2024-07-29

## 2024-12-13 NOTE — ASSESSMENT & PLAN NOTE
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 
No

## 2025-06-16 DIAGNOSIS — B00.1 RECURRENT COLD SORES: ICD-10-CM

## 2025-06-16 NOTE — TELEPHONE ENCOUNTER
Refill must be reviewed and completed by the office or provider. The refill is unable to be approved or denied by the medication management team.    Patient not seen > 2 year

## 2025-06-27 RX ORDER — VALACYCLOVIR HYDROCHLORIDE 500 MG/1
500 TABLET, FILM COATED ORAL DAILY
Qty: 30 TABLET | Refills: 3 | Status: SHIPPED | OUTPATIENT
Start: 2025-06-27 | End: 2025-12-24

## 2025-07-15 ENCOUNTER — OFFICE VISIT (OUTPATIENT)
Dept: FAMILY MEDICINE CLINIC | Facility: CLINIC | Age: 25
End: 2025-07-15
Payer: COMMERCIAL

## 2025-07-15 VITALS
HEIGHT: 70 IN | BODY MASS INDEX: 21.24 KG/M2 | RESPIRATION RATE: 16 BRPM | DIASTOLIC BLOOD PRESSURE: 80 MMHG | SYSTOLIC BLOOD PRESSURE: 118 MMHG | OXYGEN SATURATION: 98 % | TEMPERATURE: 97.1 F | HEART RATE: 92 BPM | WEIGHT: 148.4 LBS

## 2025-07-15 DIAGNOSIS — Z00.00 ANNUAL PHYSICAL EXAM: Primary | ICD-10-CM

## 2025-07-15 DIAGNOSIS — B00.1 RECURRENT COLD SORES: ICD-10-CM

## 2025-07-15 PROCEDURE — 99395 PREV VISIT EST AGE 18-39: CPT | Performed by: FAMILY MEDICINE

## 2025-07-28 DIAGNOSIS — B00.1 RECURRENT COLD SORES: ICD-10-CM

## 2025-07-29 RX ORDER — VALACYCLOVIR HYDROCHLORIDE 500 MG/1
500 TABLET, FILM COATED ORAL DAILY
Qty: 90 TABLET | Refills: 1 | Status: SHIPPED | OUTPATIENT
Start: 2025-07-29 | End: 2026-01-25